# Patient Record
Sex: FEMALE | Race: WHITE | NOT HISPANIC OR LATINO | Employment: FULL TIME | URBAN - METROPOLITAN AREA
[De-identification: names, ages, dates, MRNs, and addresses within clinical notes are randomized per-mention and may not be internally consistent; named-entity substitution may affect disease eponyms.]

---

## 2023-02-20 PROBLEM — E66.3 OVERWEIGHT WITH BODY MASS INDEX (BMI) OF 27 TO 27.9 IN ADULT: Status: ACTIVE | Noted: 2023-02-20

## 2023-02-20 PROBLEM — R79.89 LOW VITAMIN D LEVEL: Status: ACTIVE | Noted: 2023-02-20

## 2023-02-20 PROBLEM — J02.9 SORE THROAT: Status: ACTIVE | Noted: 2023-02-20

## 2023-02-20 RX ORDER — PHENTERMINE HYDROCHLORIDE 37.5 MG/1
1 TABLET ORAL DAILY
COMMUNITY
End: 2023-03-17 | Stop reason: SDUPTHER

## 2023-02-20 RX ORDER — ROSUVASTATIN CALCIUM 5 MG/1
1 TABLET, COATED ORAL DAILY
COMMUNITY
End: 2023-07-31

## 2023-02-22 PROBLEM — E78.5 MILD HYPERLIPIDEMIA: Status: ACTIVE | Noted: 2023-02-22

## 2023-03-17 ENCOUNTER — OFFICE VISIT (OUTPATIENT)
Dept: PRIMARY CARE | Facility: CLINIC | Age: 48
End: 2023-03-17
Payer: COMMERCIAL

## 2023-03-17 VITALS
DIASTOLIC BLOOD PRESSURE: 80 MMHG | HEART RATE: 102 BPM | HEIGHT: 63 IN | TEMPERATURE: 97.6 F | WEIGHT: 144 LBS | SYSTOLIC BLOOD PRESSURE: 112 MMHG | OXYGEN SATURATION: 98 % | BODY MASS INDEX: 25.52 KG/M2

## 2023-03-17 DIAGNOSIS — E78.5 MILD HYPERLIPIDEMIA: ICD-10-CM

## 2023-03-17 DIAGNOSIS — E66.3 OVERWEIGHT WITH BODY MASS INDEX (BMI) OF 27 TO 27.9 IN ADULT: Primary | ICD-10-CM

## 2023-03-17 PROCEDURE — 4004F PT TOBACCO SCREEN RCVD TLK: CPT | Performed by: FAMILY MEDICINE

## 2023-03-17 PROCEDURE — 3008F BODY MASS INDEX DOCD: CPT | Performed by: FAMILY MEDICINE

## 2023-03-17 PROCEDURE — 99213 OFFICE O/P EST LOW 20 MIN: CPT | Performed by: FAMILY MEDICINE

## 2023-03-17 RX ORDER — PHENTERMINE HYDROCHLORIDE 37.5 MG/1
37.5 TABLET ORAL
Qty: 30 TABLET | Refills: 0 | Status: SHIPPED | OUTPATIENT
Start: 2023-03-17 | End: 2023-05-12

## 2023-03-17 RX ORDER — VALACYCLOVIR HYDROCHLORIDE 1 G/1
TABLET, FILM COATED ORAL
COMMUNITY
Start: 2023-02-20

## 2023-03-17 NOTE — PROGRESS NOTES
Subjective   Patient ID: Hannah Camacho is a 47 y.o. female who presents for Med Refill.    Assessment/Plan   Problem List Items Addressed This Visit          Endocrine/Metabolic    Overweight with body mass index (BMI) of 27 to 27.9 in adult - Primary       Other    Mild hyperlipidemia     Discussed about diet exercise  Lab work today  Patient is up to date with mammogram and Pap smear  Tetanus shot in 2014  Concerned about small skin rash on right lower extremity more of a seborrheic keratosis patient is reassured  Took COVID-19 vaccine  Controlled substance contract signed today  Urine drug screen  Follow-up in a month  Risk and benefit of Adipex explained    Follow-up every year for physical come back early with any new sign and symptoms. Patient understands and in agreement with plan   Source of history: Nurse, Medical personnel, Medical record, Patient.  History limitation: None.    HPI  47-year-old female here for follow-up on BMI 28  Patient tolerated medication well except dry mouth  Patient had around 7 to 8 pound weight loss in first month  Tolerating well otherwise needs refill  Urine drug screen today          Started smoking again   drug use  2-3 drinks of wine every day  Currently following with OB/GYN has a regular mammogram  BMI 27.9  Hyperlipidemia  On and off smoking  Vitamin D insufficiency  Family history of Hodgkin's lymphoma and mother    PT LOST AROUBD 2 LBS - WOULD LIKE TO CONT MED  Discussed about cutting down on smoking    Family history is positive for colon cancer. Colon cancer screening at the age of 47  Patient will be advised to go for colonoscopy       I have personally reviewed the [place] report for [facility] [employer] [employer]. I have considered the risks of abuse, dependence, addiction and diversion.   Last urine drug screening date/ordered today: 2/16/23   Controlled Substance Agreement:   I have printed this form and reviewed each line item with the patient and the patient has  "verbalized understanding.   Date of the last Controlled Substance Agreement: 1/19/23   [place]   What is the patient’s goal of therapy? To lose more than 5% of body weight.   Before starting treatment: I have assessed the patient’s continuing efforts to lose weight, I have assessed the patient’s dedication to the treatment program and the response to treatment and I have assessed the presence or absence of contraindications, adverse effects, and indicators of possible substance abuse that would necessitate cessation of treatment utilizing controlled substance.   Allergies   Allergen Reactions    Sulfa (Sulfonamide Antibiotics) Rash       Current Outpatient Medications   Medication Sig Dispense Refill    phentermine (Adipex-P) 37.5 mg tablet Take 1 tablet (37.5 mg) by mouth once daily.      rosuvastatin (Crestor) 5 mg tablet Take 1 tablet (5 mg) by mouth once daily.      sodium chloride (Ocean) 0.65 % nasal spray       valACYclovir (Valtrex) 1 gram tablet TAKE 1 TABLET BY MOUTH TWICE DAILY. AS NEEDED FOR OUTBREAK       No current facility-administered medications for this visit.       Objective   Visit Vitals  /80 (BP Location: Left arm, Patient Position: Sitting)   Pulse 102   Temp 36.4 °C (97.6 °F)   Ht 1.588 m (5' 2.5\")   Wt 65.3 kg (144 lb)   SpO2 98%   BMI 25.92 kg/m²   Smoking Status Every Day   BSA 1.7 m²     Physical Exam  Constitutional:       General: He is not in acute distress.     Appearance: Normal appearance.   HENT:      Head: Normocephalic and atraumatic.      Nose: Nose normal.   Eyes:      Extraocular Movements: Extraocular movements intact.      Conjunctiva/sclera: Conjunctivae normal.   Cardiovascular:      Rate and Rhythm: Normal rate and regular rhythm.   Pulmonary:      Effort: Pulmonary effort is normal.      Breath sounds: Normal breath sounds.   Skin:     General: Skin is warm.   Neurological:      Mental Status: He is alert and oriented to person, place, and time.   Psychiatric:  "        Mood and Affect: Mood normal.         Behavior: Behavior normal.     Immunization History   Administered Date(s) Administered    Influenza, seasonal, injectable 01/19/2023    Moderna SARS-CoV-2 Vaccination 02/10/2021, 03/10/2021, 06/01/2022       Review of Systems    Legacy Encounter on 02/16/2023   Component Date Value Ref Range Status    DRUG SCREEN COMMENT URINE 02/16/2023 SEE BELOW   Final    Creatine, Urine 02/16/2023 208.3  mg/dL Final    Amphetamine Screen, Urine 02/16/2023 PRESUMPTIVE POSITIVE (A)  NEGATIVE Final    Barbiturate Screen, Urine 02/16/2023 PRESUMPTIVE NEGATIVE  NEGATIVE Final    Cannabinoid Screen, Urine 02/16/2023 PRESUMPTIVE NEGATIVE  NEGATIVE Final    Cocaine Screen, Urine 02/16/2023 PRESUMPTIVE NEGATIVE  NEGATIVE Final    PCP Screen, Urine 02/16/2023 PRESUMPTIVE NEGATIVE  NEGATIVE Final    7-Aminoclonazepam 02/16/2023 <25  Cutoff <25 ng/mL Final    Alpha-Hydroxyalprazolam 02/16/2023 <25  Cutoff <25 ng/mL Final    Alpha-Hydroxymidazolam 02/16/2023 <25  Cutoff <25 ng/mL Final    Alprazolam 02/16/2023 <25  Cutoff <25 ng/mL Final    Chlordiazepoxide 02/16/2023 <25  Cutoff <25 ng/mL Final    Clonazepam 02/16/2023 <25  Cutoff <25 ng/mL Final    Diazepam 02/16/2023 <25  Cutoff <25 ng/mL Final    Lorazepam 02/16/2023 <25  Cutoff <25 ng/mL Final    Midazolam 02/16/2023 <25  Cutoff <25 ng/mL Final    Nordiazepam 02/16/2023 <25  Cutoff <25 ng/mL Final    Oxazepam 02/16/2023 <25  Cutoff <25 ng/mL Final    Temazepam 02/16/2023 <25  Cutoff <25 ng/mL Final    Zolpidem 02/16/2023 <25  Cutoff <25 ng/mL Final    Zolpidem Metabolite (ZCA) 02/16/2023 <25  Cutoff <25 ng/mL Final    6-Acetylmorphine 02/16/2023 <25  Cutoff <25 ng/mL Final    Codeine 02/16/2023 <50  Cutoff <50 ng/mL Final    Hydrocodone 02/16/2023 <25  Cutoff <25 ng/mL Final    Hydromorphone 02/16/2023 <25  Cutoff <25 ng/mL Final    Morphine Urine 02/16/2023 <50  Cutoff <50 ng/mL Final    Norhydrocodone 02/16/2023 <25  Cutoff <25 ng/mL Final     Noroxycodone 02/16/2023 <25  Cutoff <25 ng/mL Final    Oxycodone 02/16/2023 <25  Cutoff <25 ng/mL Final    Oxymorphone 02/16/2023 <25  Cutoff <25 ng/mL Final    Tramadol 02/16/2023 <50  Cutoff <50 ng/mL Final    O-Desmethyltramadol 02/16/2023 <50  Cutoff <50 ng/mL Final    Fentanyl 02/16/2023 <2.5  Cutoff<2.5 ng/mL Final    Norfentanyl 02/16/2023 <2.5  Cutoff<2.5 ng/mL Final    METHADONE CONFIRMATION,URINE 02/16/2023 <25  Cutoff <25 ng/mL Final    EDDP 02/16/2023 <25  Cutoff <25 ng/mL Final    Amphetamines,Urine 02/16/2023 <50  ng/mL Final    MDA, Urine 02/16/2023 <200  ng/mL Final    MDEA, Urine 02/16/2023 <200  ng/mL Final    MDMA, Urine 02/16/2023 <200  ng/mL Final    Methamphetamine Quant, Ur 02/16/2023 <200  ng/mL Final    Phentermine,Urine 02/16/2023 >5000  ng/mL Final   Legacy Encounter on 01/19/2023   Component Date Value Ref Range Status    Cholesterol 01/19/2023 264 (H)  0 - 199 mg/dL Final    HDL 01/19/2023 100.0  mg/dL Final    Cholesterol/HDL Ratio 01/19/2023 2.6   Final    LDL 01/19/2023 150 (H)  0 - 99 mg/dL Final    VLDL 01/19/2023 14  0 - 40 mg/dL Final    Triglycerides 01/19/2023 72  0 - 149 mg/dL Final    Vitamin D, 25-Hydroxy 01/19/2023 46  ng/mL Final    WBC 01/19/2023 6.5  4.4 - 11.3 x10E9/L Final    nRBC 01/19/2023 0.0  0.0 - 0.0 /100 WBC Final    RBC 01/19/2023 4.44  4.00 - 5.20 x10E12/L Final    Hemoglobin 01/19/2023 14.2  12.0 - 16.0 g/dL Final    Hematocrit 01/19/2023 43.9  36.0 - 46.0 % Final    MCV 01/19/2023 99  80 - 100 fL Final    MCHC 01/19/2023 32.3  32.0 - 36.0 g/dL Final    Platelets 01/19/2023 296  150 - 450 x10E9/L Final    RDW 01/19/2023 11.5  11.5 - 14.5 % Final    TSH 01/19/2023 2.37  0.44 - 3.98 mIU/L Final    Glucose 01/19/2023 91  74 - 99 mg/dL Final    Sodium 01/19/2023 140  136 - 145 mmol/L Final    Potassium 01/19/2023 4.0  3.5 - 5.3 mmol/L Final    Chloride 01/19/2023 101  98 - 107 mmol/L Final    Bicarbonate 01/19/2023 30  21 - 32 mmol/L Final    Anion Gap  2023 13  10 - 20 mmol/L Final    Urea Nitrogen 2023 12  6 - 23 mg/dL Final    Creatinine 2023 0.78  0.50 - 1.05 mg/dL Final    GFR Female 2023 >90  >90 mL/min/1.73m2 Final    Calcium 2023 10.5  8.6 - 10.6 mg/dL Final    Albumin 2023 4.9  3.4 - 5.0 g/dL Final    Alkaline Phosphatase 2023 44  33 - 110 U/L Final    Total Protein 2023 7.4  6.4 - 8.2 g/dL Final    AST 2023 22  9 - 39 U/L Final    Total Bilirubin 2023 1.0  0.0 - 1.2 mg/dL Final    ALT (SGPT) 2023 26  7 - 45 U/L Final       Radiology: Reviewed imaging in powerchart.  No results found.    Family History   Problem Relation Name Age of Onset    Lymphoma Mother      Other (CVA) Maternal Grandmother      Diabetes Paternal Grandmother      Colon cancer Other       Social History     Socioeconomic History    Marital status:      Spouse name: None    Number of children: None    Years of education: None    Highest education level: None   Occupational History    None   Tobacco Use    Smoking status: Every Day     Types: Cigarettes     Start date: 2022    Smokeless tobacco: Never   Vaping Use    Vaping status: None   Substance and Sexual Activity    Alcohol use: Yes     Alcohol/week: 5.0 standard drinks of alcohol     Types: 2 Glasses of wine, 1 Cans of beer, 1 Shots of liquor, 1 Standard drinks or equivalent per week    Drug use: Never    Sexual activity: None   Other Topics Concern    None   Social History Narrative    None     Social Determinants of Health     Financial Resource Strain: Not on file   Food Insecurity: Not on file   Transportation Needs: Not on file   Physical Activity: Not on file   Stress: Not on file   Social Connections: Not on file   Intimate Partner Violence: Not on file   Housing Stability: Not on file     Past Medical History:   Diagnosis Date    Personal history of other complications of pregnancy, childbirth and the puerperium     History of spontaneous      Personal history of other infectious and parasitic diseases     History of herpes simplex infection    Personal history of other mental and behavioral disorders     History of anxiety    Personal history of other mental and behavioral disorders     History of obsessive compulsive disorder     Past Surgical History:   Procedure Laterality Date    DILATION AND CURETTAGE OF UTERUS  04/27/2018    Dilation And Curettage       Charting was completed using voice recognition technology and may include unintended errors.

## 2023-03-17 NOTE — LETTER
March 17, 2023     Patient: Hannah Camacho   YOB: 1975   Date of Visit: 3/17/2023       To Whom It May Concern:    Hannah Camacho was seen in my clinic on 3/17/2023 at 9:30 am. Please excuse Hannah for her absence from work on this day to make the appointment.    If you have any questions or concerns, please don't hesitate to call.         Sincerely,         Genaro Padilla MD        CC: No Recipients

## 2023-05-12 ENCOUNTER — OFFICE VISIT (OUTPATIENT)
Dept: PRIMARY CARE | Facility: CLINIC | Age: 48
End: 2023-05-12
Payer: COMMERCIAL

## 2023-05-12 VITALS
BODY MASS INDEX: 25.69 KG/M2 | DIASTOLIC BLOOD PRESSURE: 82 MMHG | OXYGEN SATURATION: 99 % | HEIGHT: 63 IN | HEART RATE: 75 BPM | WEIGHT: 145 LBS | SYSTOLIC BLOOD PRESSURE: 131 MMHG

## 2023-05-12 DIAGNOSIS — E66.3 OVERWEIGHT WITH BODY MASS INDEX (BMI) OF 26 TO 26.9 IN ADULT: Primary | ICD-10-CM

## 2023-05-12 PROCEDURE — 3008F BODY MASS INDEX DOCD: CPT | Performed by: FAMILY MEDICINE

## 2023-05-12 PROCEDURE — 4004F PT TOBACCO SCREEN RCVD TLK: CPT | Performed by: FAMILY MEDICINE

## 2023-05-12 PROCEDURE — 99213 OFFICE O/P EST LOW 20 MIN: CPT | Performed by: FAMILY MEDICINE

## 2023-05-12 RX ORDER — CARBOXYMETHYLCELLULOSE/CITRIC 0.75 G
3 CAPSULE ORAL 2 TIMES DAILY
Qty: 60 CAPSULE | Refills: 1 | Status: SHIPPED | OUTPATIENT
Start: 2023-05-12 | End: 2023-07-11

## 2023-05-12 NOTE — LETTER
May 12, 2023     Patient: Hannah Camacho   YOB: 1975   Date of Visit: 5/12/2023       To Whom It May Concern:    Hannah Camacho was seen in my clinic on 5/12/2023 at 12:30 pm. Please excuse Hannah for her absence from work on this day to make the appointment.    If you have any questions or concerns, please don't hesitate to call.         Sincerely,         Genaro Padilla MD        CC: No Recipients

## 2023-05-12 NOTE — PROGRESS NOTES
"Subjective   Patient ID: Hannah Camacho is a 47 y.o. female who presents for Weight Loss.    Assessment/Plan   Problem List Items Addressed This Visit    None  Visit Diagnoses       Overweight with body mass index (BMI) of 26 to 26.9 in adult    -  Primary    Relevant Medications    carboxymethylcellulose-citric (Plenity, Welcome Kit,) 0.75 gram capsule        Discussed about diet exercise    Patient is up to date with mammogram and Pap smear  Tetanus shot in 2014        HPI        47-year-old female here     Here to discuss about weight loss options discussed in detail patient's BMI is 26  Will consider Plenity risk-benefit discussed also may consider Wellbutrin if patient is interested as patient started smoking back      Started smoking again  No drug use  2-3 drinks of wine every day  Currently following with OB/GYN has a regular mammogram  BMI 27.9  Hyperlipidemia  On and off smoking  Vitamin D insufficiency  Family history of Hodgkin's lymphoma and mother  Discussed about cutting down on smoking    Family history is positive for colon cancer. Colon cancer screening at the age of 47  Patient will be advised to go for colonoscopy       Allergies   Allergen Reactions    Sulfa (Sulfonamide Antibiotics) Rash       Current Outpatient Medications   Medication Sig Dispense Refill    rosuvastatin (Crestor) 5 mg tablet Take 1 tablet (5 mg) by mouth once daily.      carboxymethylcellulose-citric (Plenity, Welcome Kit,) 0.75 gram capsule Take 3 capsules by mouth 2 times a day. 60 capsule 1    sodium chloride (Ocean) 0.65 % nasal spray       valACYclovir (Valtrex) 1 gram tablet TAKE 1 TABLET BY MOUTH TWICE DAILY. AS NEEDED FOR OUTBREAK       No current facility-administered medications for this visit.       Objective   Visit Vitals  /82 (BP Location: Left arm, Patient Position: Sitting)   Pulse 75   Ht 1.588 m (5' 2.5\")   Wt 65.8 kg (145 lb)   SpO2 99%   BMI 26.10 kg/m²   Smoking Status Every Day   BSA 1.7 " m²     Physical Exam  Constitutional:       General: He is not in acute distress.     Appearance: Normal appearance.   HENT:      Head: Normocephalic and atraumatic.      Nose: Nose normal.   Eyes:      Extraocular Movements: Extraocular movements intact.      Conjunctiva/sclera: Conjunctivae normal.   Cardiovascular:      Rate and Rhythm: Normal rate and regular rhythm.   Pulmonary:      Effort: Pulmonary effort is normal.      Breath sounds: Normal breath sounds.   Skin:     General: Skin is warm.   Neurological:      Mental Status: He is alert and oriented to person, place, and time.   Psychiatric:         Mood and Affect: Mood normal.         Behavior: Behavior normal.     Immunization History   Administered Date(s) Administered    Influenza, seasonal, injectable 01/19/2023    Moderna SARS-CoV-2 Vaccination 02/10/2021, 03/10/2021, 06/01/2022       Review of Systems    Legacy Encounter on 02/16/2023   Component Date Value Ref Range Status    DRUG SCREEN COMMENT URINE 02/16/2023 SEE BELOW   Final    Creatine, Urine 02/16/2023 208.3  mg/dL Final    Amphetamine Screen, Urine 02/16/2023 PRESUMPTIVE POSITIVE (A)  NEGATIVE Final    Barbiturate Screen, Urine 02/16/2023 PRESUMPTIVE NEGATIVE  NEGATIVE Final    Cannabinoid Screen, Urine 02/16/2023 PRESUMPTIVE NEGATIVE  NEGATIVE Final    Cocaine Screen, Urine 02/16/2023 PRESUMPTIVE NEGATIVE  NEGATIVE Final    PCP Screen, Urine 02/16/2023 PRESUMPTIVE NEGATIVE  NEGATIVE Final    7-Aminoclonazepam 02/16/2023 <25  Cutoff <25 ng/mL Final    Alpha-Hydroxyalprazolam 02/16/2023 <25  Cutoff <25 ng/mL Final    Alpha-Hydroxymidazolam 02/16/2023 <25  Cutoff <25 ng/mL Final    Alprazolam 02/16/2023 <25  Cutoff <25 ng/mL Final    Chlordiazepoxide 02/16/2023 <25  Cutoff <25 ng/mL Final    Clonazepam 02/16/2023 <25  Cutoff <25 ng/mL Final    Diazepam 02/16/2023 <25  Cutoff <25 ng/mL Final    Lorazepam 02/16/2023 <25  Cutoff <25 ng/mL Final    Midazolam 02/16/2023 <25  Cutoff <25 ng/mL  Final    Nordiazepam 02/16/2023 <25  Cutoff <25 ng/mL Final    Oxazepam 02/16/2023 <25  Cutoff <25 ng/mL Final    Temazepam 02/16/2023 <25  Cutoff <25 ng/mL Final    Zolpidem 02/16/2023 <25  Cutoff <25 ng/mL Final    Zolpidem Metabolite (ZCA) 02/16/2023 <25  Cutoff <25 ng/mL Final    6-Acetylmorphine 02/16/2023 <25  Cutoff <25 ng/mL Final    Codeine 02/16/2023 <50  Cutoff <50 ng/mL Final    Hydrocodone 02/16/2023 <25  Cutoff <25 ng/mL Final    Hydromorphone 02/16/2023 <25  Cutoff <25 ng/mL Final    Morphine Urine 02/16/2023 <50  Cutoff <50 ng/mL Final    Norhydrocodone 02/16/2023 <25  Cutoff <25 ng/mL Final    Noroxycodone 02/16/2023 <25  Cutoff <25 ng/mL Final    Oxycodone 02/16/2023 <25  Cutoff <25 ng/mL Final    Oxymorphone 02/16/2023 <25  Cutoff <25 ng/mL Final    Tramadol 02/16/2023 <50  Cutoff <50 ng/mL Final    O-Desmethyltramadol 02/16/2023 <50  Cutoff <50 ng/mL Final    Fentanyl 02/16/2023 <2.5  Cutoff<2.5 ng/mL Final    Norfentanyl 02/16/2023 <2.5  Cutoff<2.5 ng/mL Final    METHADONE CONFIRMATION,URINE 02/16/2023 <25  Cutoff <25 ng/mL Final    EDDP 02/16/2023 <25  Cutoff <25 ng/mL Final    Amphetamines,Urine 02/16/2023 <50  ng/mL Final    MDA, Urine 02/16/2023 <200  ng/mL Final    MDEA, Urine 02/16/2023 <200  ng/mL Final    MDMA, Urine 02/16/2023 <200  ng/mL Final    Methamphetamine Quant, Ur 02/16/2023 <200  ng/mL Final    Phentermine,Urine 02/16/2023 >5000  ng/mL Final   Legacy Encounter on 01/19/2023   Component Date Value Ref Range Status    Cholesterol 01/19/2023 264 (H)  0 - 199 mg/dL Final    HDL 01/19/2023 100.0  mg/dL Final    Cholesterol/HDL Ratio 01/19/2023 2.6   Final    LDL 01/19/2023 150 (H)  0 - 99 mg/dL Final    VLDL 01/19/2023 14  0 - 40 mg/dL Final    Triglycerides 01/19/2023 72  0 - 149 mg/dL Final    Vitamin D, 25-Hydroxy 01/19/2023 46  ng/mL Final    WBC 01/19/2023 6.5  4.4 - 11.3 x10E9/L Final    nRBC 01/19/2023 0.0  0.0 - 0.0 /100 WBC Final    RBC 01/19/2023 4.44  4.00 - 5.20  x10E12/L Final    Hemoglobin 01/19/2023 14.2  12.0 - 16.0 g/dL Final    Hematocrit 01/19/2023 43.9  36.0 - 46.0 % Final    MCV 01/19/2023 99  80 - 100 fL Final    MCHC 01/19/2023 32.3  32.0 - 36.0 g/dL Final    Platelets 01/19/2023 296  150 - 450 x10E9/L Final    RDW 01/19/2023 11.5  11.5 - 14.5 % Final    TSH 01/19/2023 2.37  0.44 - 3.98 mIU/L Final    Glucose 01/19/2023 91  74 - 99 mg/dL Final    Sodium 01/19/2023 140  136 - 145 mmol/L Final    Potassium 01/19/2023 4.0  3.5 - 5.3 mmol/L Final    Chloride 01/19/2023 101  98 - 107 mmol/L Final    Bicarbonate 01/19/2023 30  21 - 32 mmol/L Final    Anion Gap 01/19/2023 13  10 - 20 mmol/L Final    Urea Nitrogen 01/19/2023 12  6 - 23 mg/dL Final    Creatinine 01/19/2023 0.78  0.50 - 1.05 mg/dL Final    GFR Female 01/19/2023 >90  >90 mL/min/1.73m2 Final    Calcium 01/19/2023 10.5  8.6 - 10.6 mg/dL Final    Albumin 01/19/2023 4.9  3.4 - 5.0 g/dL Final    Alkaline Phosphatase 01/19/2023 44  33 - 110 U/L Final    Total Protein 01/19/2023 7.4  6.4 - 8.2 g/dL Final    AST 01/19/2023 22  9 - 39 U/L Final    Total Bilirubin 01/19/2023 1.0  0.0 - 1.2 mg/dL Final    ALT (SGPT) 01/19/2023 26  7 - 45 U/L Final       Radiology: Reviewed imaging in powerchart.  No results found.    Family History   Problem Relation Name Age of Onset    Lymphoma Mother      Other (CVA) Maternal Grandmother      Diabetes Paternal Grandmother      Colon cancer Other       Social History     Socioeconomic History    Marital status:      Spouse name: None    Number of children: None    Years of education: None    Highest education level: None   Occupational History    None   Tobacco Use    Smoking status: Every Day     Types: Cigarettes     Start date: 8/1/2022    Smokeless tobacco: Never   Vaping Use    Vaping status: None   Substance and Sexual Activity    Alcohol use: Yes     Alcohol/week: 5.0 standard drinks of alcohol     Types: 2 Glasses of wine, 1 Cans of beer, 1 Shots of liquor, 1 Standard  drinks or equivalent per week    Drug use: Never    Sexual activity: None   Other Topics Concern    None   Social History Narrative    None     Social Determinants of Health     Financial Resource Strain: Not on file   Food Insecurity: Not on file   Transportation Needs: Not on file   Physical Activity: Not on file   Stress: Not on file   Social Connections: Not on file   Intimate Partner Violence: Not on file   Housing Stability: Not on file     Past Medical History:   Diagnosis Date    Personal history of other complications of pregnancy, childbirth and the puerperium     History of spontaneous     Personal history of other infectious and parasitic diseases     History of herpes simplex infection    Personal history of other mental and behavioral disorders     History of anxiety    Personal history of other mental and behavioral disorders     History of obsessive compulsive disorder     Past Surgical History:   Procedure Laterality Date    DILATION AND CURETTAGE OF UTERUS  2018    Dilation And Curettage       Charting was completed using voice recognition technology and may include unintended errors.

## 2023-07-24 ENCOUNTER — APPOINTMENT (OUTPATIENT)
Dept: PRIMARY CARE | Facility: CLINIC | Age: 48
End: 2023-07-24
Payer: COMMERCIAL

## 2023-07-28 DIAGNOSIS — E78.5 HYPERLIPIDEMIA, UNSPECIFIED: ICD-10-CM

## 2023-07-31 RX ORDER — ROSUVASTATIN CALCIUM 5 MG/1
5 TABLET, COATED ORAL DAILY
Qty: 90 TABLET | Refills: 1 | Status: SHIPPED | OUTPATIENT
Start: 2023-07-31 | End: 2024-02-06

## 2023-08-22 ENCOUNTER — TELEPHONE (OUTPATIENT)
Dept: PRIMARY CARE | Facility: CLINIC | Age: 48
End: 2023-08-22

## 2023-08-22 NOTE — TELEPHONE ENCOUNTER
NEEDED TO RESCHEDULE DUE WORK. SHE WILL CONTACT YOU BY MY CHART TO LET YOU KNOW WHAT VACCINES SHE NEEDS

## 2023-08-23 ENCOUNTER — APPOINTMENT (OUTPATIENT)
Dept: PRIMARY CARE | Facility: CLINIC | Age: 48
End: 2023-08-23
Payer: COMMERCIAL

## 2023-09-06 ENCOUNTER — TELEPHONE (OUTPATIENT)
Dept: PRIMARY CARE | Facility: CLINIC | Age: 48
End: 2023-09-06

## 2023-09-06 ENCOUNTER — CLINICAL SUPPORT (OUTPATIENT)
Dept: PRIMARY CARE | Facility: CLINIC | Age: 48
End: 2023-09-06
Payer: COMMERCIAL

## 2023-09-06 DIAGNOSIS — Z23 NEED FOR TDAP VACCINATION: ICD-10-CM

## 2023-09-06 PROCEDURE — 90471 IMMUNIZATION ADMIN: CPT | Performed by: FAMILY MEDICINE

## 2023-09-06 PROCEDURE — 90715 TDAP VACCINE 7 YRS/> IM: CPT | Performed by: FAMILY MEDICINE

## 2024-01-29 DIAGNOSIS — E78.5 HYPERLIPIDEMIA, UNSPECIFIED: ICD-10-CM

## 2024-02-06 RX ORDER — ROSUVASTATIN CALCIUM 5 MG/1
5 TABLET, COATED ORAL DAILY
Qty: 90 TABLET | Refills: 0 | Status: SHIPPED | OUTPATIENT
Start: 2024-02-06 | End: 2024-05-07

## 2024-02-22 ENCOUNTER — OFFICE VISIT (OUTPATIENT)
Dept: NEUROLOGY | Facility: CLINIC | Age: 49
End: 2024-02-22
Payer: COMMERCIAL

## 2024-02-22 VITALS
HEIGHT: 63 IN | DIASTOLIC BLOOD PRESSURE: 72 MMHG | BODY MASS INDEX: 25.87 KG/M2 | SYSTOLIC BLOOD PRESSURE: 114 MMHG | HEART RATE: 66 BPM | WEIGHT: 146 LBS

## 2024-02-22 DIAGNOSIS — R79.89 LOW VITAMIN D LEVEL: ICD-10-CM

## 2024-02-22 DIAGNOSIS — G93.40 ENCEPHALOPATHY: Primary | ICD-10-CM

## 2024-02-22 DIAGNOSIS — F90.2 ATTENTION DEFICIT HYPERACTIVITY DISORDER (ADHD), COMBINED TYPE: ICD-10-CM

## 2024-02-22 LAB
AMPHETAMINES UR QL SCN: NORMAL
BARBITURATES UR QL SCN: NORMAL
BENZODIAZ UR QL SCN: NORMAL
BZE UR QL SCN: NORMAL
CANNABINOIDS UR QL SCN: NORMAL
FENTANYL+NORFENTANYL UR QL SCN: NORMAL
OPIATES UR QL SCN: NORMAL
OXYCODONE+OXYMORPHONE UR QL SCN: NORMAL
PCP UR QL SCN: NORMAL

## 2024-02-22 PROCEDURE — 3008F BODY MASS INDEX DOCD: CPT | Performed by: PSYCHIATRY & NEUROLOGY

## 2024-02-22 PROCEDURE — 80307 DRUG TEST PRSMV CHEM ANLYZR: CPT

## 2024-02-22 PROCEDURE — 99205 OFFICE O/P NEW HI 60 MIN: CPT | Performed by: PSYCHIATRY & NEUROLOGY

## 2024-02-22 RX ORDER — DEXMETHYLPHENIDATE HYDROCHLORIDE 10 MG/1
10 TABLET ORAL DAILY
Qty: 30 TABLET | Refills: 0 | Status: SHIPPED | OUTPATIENT
Start: 2024-02-22 | End: 2024-05-06 | Stop reason: SDUPTHER

## 2024-02-22 RX ORDER — DEXMETHYLPHENIDATE HYDROCHLORIDE 20 MG/1
20 CAPSULE, EXTENDED RELEASE ORAL DAILY
Qty: 30 CAPSULE | Refills: 0 | Status: SHIPPED | OUTPATIENT
Start: 2024-02-22 | End: 2024-05-06 | Stop reason: SDUPTHER

## 2024-02-22 RX ORDER — DEXMETHYLPHENIDATE HYDROCHLORIDE 20 MG/1
20 CAPSULE, EXTENDED RELEASE ORAL DAILY
Qty: 30 CAPSULE | Refills: 0 | Status: SHIPPED | OUTPATIENT
Start: 2024-03-23 | End: 2024-05-06 | Stop reason: SDUPTHER

## 2024-02-22 RX ORDER — DEXMETHYLPHENIDATE HYDROCHLORIDE 20 MG/1
20 CAPSULE, EXTENDED RELEASE ORAL DAILY
Qty: 30 CAPSULE | Refills: 0 | Status: SHIPPED | OUTPATIENT
Start: 2024-04-22 | End: 2024-05-06 | Stop reason: SDUPTHER

## 2024-02-22 RX ORDER — DEXMETHYLPHENIDATE HYDROCHLORIDE 10 MG/1
10 TABLET ORAL DAILY
Qty: 30 TABLET | Refills: 0 | Status: SHIPPED | OUTPATIENT
Start: 2024-04-22 | End: 2024-05-06 | Stop reason: SDUPTHER

## 2024-02-22 RX ORDER — DEXMETHYLPHENIDATE HYDROCHLORIDE 10 MG/1
10 TABLET ORAL DAILY
Qty: 30 TABLET | Refills: 0 | Status: SHIPPED | OUTPATIENT
Start: 2024-03-23 | End: 2024-05-06 | Stop reason: SDUPTHER

## 2024-02-22 ASSESSMENT — PATIENT HEALTH QUESTIONNAIRE - PHQ9
2. FEELING DOWN, DEPRESSED OR HOPELESS: NOT AT ALL
1. LITTLE INTEREST OR PLEASURE IN DOING THINGS: NOT AT ALL
SUM OF ALL RESPONSES TO PHQ9 QUESTIONS 1 & 2: 0

## 2024-02-22 ASSESSMENT — ENCOUNTER SYMPTOMS: DECREASED CONCENTRATION: 1

## 2024-02-22 NOTE — PROGRESS NOTES
Hannah Cortes Doug  48 y.o.       SUBJECTIVE    HPI   Hannah is a 48-year-old young lady who was seen today for evaluation of a possible attention deficit disorder difficulty at work and at home.  She had the symptoms since early childhood but never diagnosed or treated with any medication.  On the diagnostic checklist is suggestive of combined type of ADHD.  Since the condition is affecting her socially and functionally I would like to try her on Focalin XR 20 in the morning and take 10 in the afternoon as needed and depending on how she does might make future recommendation    As you recall, Hannah is a 48-year-old lady has been having difficulty since early childhood according to her she is having hard time with her attention span concentration and focusing.  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  On the diagnostic checklist she had 6 out of 9 for both inattentive and hyperactive type of ADHD    Her birth development past medical history is unremarkable except she had short upper and lower extremities.    She lives with her boyfriend and has a 24-year-old and a 10-year-old son out of which 10-year-old son has ADHD and currently on Focalin.  She has 2 sisters and a brother and 1 sister has ADHD and bipolar and other brother has ADHD but she is not sure what medicine they are on    No history of smoking alcohol or any substance abuse.      Due to technical limitations of voice recognition and human error, this note may not accurately reflect the care of the patient.    Review of Systems   Psychiatric/Behavioral:  Positive for decreased concentration.         Patient Active Problem List   Diagnosis    Low vitamin D level    Sore throat    Mild hyperlipidemia     Past Medical History:   Diagnosis Date    Personal history of other complications of pregnancy, childbirth and the puerperium     History of spontaneous     Personal history of other  "infectious and parasitic diseases     History of herpes simplex infection    Personal history of other mental and behavioral disorders     History of anxiety    Personal history of other mental and behavioral disorders     History of obsessive compulsive disorder     Past Surgical History:   Procedure Laterality Date    DILATION AND CURETTAGE OF UTERUS  04/27/2018    Dilation And Curettage       reports that she has been smoking cigarettes. She started smoking about 18 months ago. She has been smoking an average of .25 packs per day. She has never used smokeless tobacco. She reports current alcohol use of about 5.0 standard drinks of alcohol per week. She reports that she does not use drugs.    /72 (BP Location: Right arm, Patient Position: Sitting)   Pulse 66   Ht 1.588 m (5' 2.5\")   Wt 66.2 kg (146 lb)   BMI 26.28 kg/m²     OBJECTIVE  Physical Exam/Neurological Exam   Constitutional: General appearance: no acute distress   Auscultation of Heart: Regular rate and rhythm, no murmurs, normal S1 and S2.   Carotid Arteries: Intact without any bruits.   Neck is supple.   No lymph adenopathy.   Peripheral Vascular Exam: Pulses +2 and equal in all extremities. No swelling, varicosities, edema or tenderness to palpations.  Chart upper and lower extremities.  Abdomen is soft, nondistended. No organomegaly.  Mental status: The patient was in no distress, alert, interactive and cooperative. Affect is appropriate.   Orientation: oriented to person, oriented to place and oriented to time.   Memory: recent memory intact and remote memory intact.   Attention: normal attention span and normal concentrating ability.   Language: normal comprehension and no difficulty naming common objects.   Fund of knowledge: Patient displays adequate knowledge of current events, adequate fund of knowledge regarding past history and adequate fund of knowledge regarding vocabulary.   Eyes: The ophthalmoscopic examination was normal. The " fundi are visualized with normal disc margins and without.  Cranial nerve II: Visual fields full to confrontation.   Cranial nerves III, IV, and VI: Pupils round, equally reactive to light; no ptosis. EOMs intact. No nystagmus.   Cranial Nerve V: Facial sensation intact bilaterally.   Cranial nerve VII: Normal and symmetric facial strength.   Cranial nerve VIII: Hearing is intact bilaterally to finger rub / whisper.   Cranial nerves IX and X: Palate elevates symmetrically.   Cranial nerve XI: Shoulder shrug and neck rotation strength are intact.   Cranial nerve XII: Tongue midline with normal strength.   Motor: Motor exam was normal. Muscle bulk was normal in both upper and lower extremities. Muscle tone was normal in both upper and lower extremities. Muscle strength was 5/5 throughout. no abnormal or adventitious movements were present.   Deep Tendon Reflexes: left biceps 2+ , right biceps 2+, left triceps 2+, right triceps 2+, left brachioradialis 2+, right brachioradialis 2+, left patella 2+, right patella 2+, left ankle jerk 2+, right ankle jerk 2+   Plantar Reflex: Toes downgoing to plantar stimulation on the left. Toes downgoing to plantar stimulation on the right.   Sensory Exam: Normal to light touch.   Coordination: There is no limb dystaxia and rapid alternating movements are intact.  Gait: Gait is normal without spasticity, ataxia or bradykinesia. Stance is stable with a negative Romberg.      ASSESSMENT/PLAN  Diagnoses and all orders for this visit:  Encephalopathy  Attention deficit hyperactivity disorder (ADHD), combined type  -     dexmethylphenidate XR (Focalin XR) 20 mg 24 hr capsule; Take 1 capsule (20 mg) by mouth once daily. Do not crush, chew, or split.  -     dexmethylphenidate XR (Focalin XR) 20 mg 24 hr capsule; Take 1 capsule (20 mg) by mouth once daily. Do not crush, chew, or split. Do not start before March 23, 2024.  -     dexmethylphenidate XR (Focalin XR) 20 mg 24 hr capsule; Take 1  capsule (20 mg) by mouth once daily. Do not crush, chew, or split. Do not start before April 22, 2024.  -     dexmethylphenidate (Focalin) 10 mg tablet; Take 1 tablet (10 mg) by mouth once daily.  -     dexmethylphenidate (Focalin) 10 mg tablet; Take 1 tablet (10 mg) by mouth once daily. Do not start before March 23, 2024.  -     dexmethylphenidate (Focalin) 10 mg tablet; Take 1 tablet (10 mg) by mouth once daily. Do not start before April 22, 2024.  -     Drug Screen, Urine With Reflex to Confirmation; Future  Low vitamin D level        Altaf Grimaldo MD  2/22/2024  12:28 PM

## 2024-03-21 PROBLEM — R93.89 ABNORMAL FINDING OF DIAGNOSTIC IMAGING: Status: ACTIVE | Noted: 2024-03-21

## 2024-03-21 PROBLEM — N83.291 COMPLEX CYST OF RIGHT OVARY: Status: ACTIVE | Noted: 2023-11-02

## 2024-03-21 PROBLEM — R07.9 CHEST PAIN: Status: ACTIVE | Noted: 2018-08-09

## 2024-03-21 PROBLEM — N83.8 PARATUBAL CYST: Status: ACTIVE | Noted: 2023-11-02

## 2024-03-21 PROBLEM — M79.629 PAIN IN AXILLA: Status: ACTIVE | Noted: 2024-03-21

## 2024-03-21 PROBLEM — D25.1 INTRAMURAL UTERINE FIBROID: Status: ACTIVE | Noted: 2023-11-02

## 2024-03-21 PROBLEM — Z86.19 HISTORY OF VIRAL INFECTION: Status: ACTIVE | Noted: 2024-03-21

## 2024-03-21 PROBLEM — M54.41 CHRONIC RIGHT-SIDED LOW BACK PAIN WITH RIGHT-SIDED SCIATICA: Status: ACTIVE | Noted: 2018-11-19

## 2024-03-21 PROBLEM — N64.89 PSEUDOANGIOMATOUS STROMAL HYPERPLASIA OF BREAST: Status: ACTIVE | Noted: 2020-02-01

## 2024-03-21 PROBLEM — J30.9 ALLERGIC RHINITIS: Status: ACTIVE | Noted: 2018-11-19

## 2024-03-21 PROBLEM — R10.11 RIGHT UPPER QUADRANT ABDOMINAL PAIN: Status: ACTIVE | Noted: 2018-08-09

## 2024-03-21 PROBLEM — N83.292 COMPLEX CYST OF LEFT OVARY: Status: ACTIVE | Noted: 2023-11-02

## 2024-03-21 PROBLEM — N60.11 FIBROCYSTIC BREAST CHANGES, BILATERAL: Status: ACTIVE | Noted: 2020-02-01

## 2024-03-21 PROBLEM — G89.29 CHRONIC RIGHT-SIDED LOW BACK PAIN WITH RIGHT-SIDED SCIATICA: Status: ACTIVE | Noted: 2018-11-19

## 2024-03-21 PROBLEM — R21 RASH: Status: ACTIVE | Noted: 2024-03-21

## 2024-03-21 PROBLEM — N60.12 FIBROCYSTIC BREAST CHANGES, BILATERAL: Status: ACTIVE | Noted: 2020-02-01

## 2024-03-21 PROBLEM — N80.03 ADENOMYOSIS OF THE UTERUS: Status: ACTIVE | Noted: 2023-11-02

## 2024-03-21 PROBLEM — Z86.59 HISTORY OF MENTAL DISORDER: Status: ACTIVE | Noted: 2024-03-21

## 2024-03-21 RX ORDER — VALACYCLOVIR HYDROCHLORIDE 1 G/1
1000 TABLET, FILM COATED ORAL 2 TIMES DAILY
COMMUNITY
Start: 2023-08-09

## 2024-03-21 RX ORDER — METHYLPREDNISOLONE 4 MG/1
TABLET ORAL
COMMUNITY
Start: 2024-03-11

## 2024-03-21 RX ORDER — VALACYCLOVIR HYDROCHLORIDE 500 MG/1
500 TABLET, FILM COATED ORAL DAILY
COMMUNITY

## 2024-03-21 RX ORDER — DOXYCYCLINE 100 MG/1
CAPSULE ORAL
COMMUNITY
Start: 2023-05-18

## 2024-03-21 RX ORDER — METRONIDAZOLE 500 MG/1
TABLET ORAL
COMMUNITY
Start: 2023-08-12

## 2024-03-21 RX ORDER — LEVONORGESTREL 52 MG/1
1 INTRAUTERINE DEVICE INTRAUTERINE
COMMUNITY

## 2024-03-21 RX ORDER — AMOXICILLIN 875 MG/1
875 TABLET, FILM COATED ORAL EVERY 12 HOURS
COMMUNITY
Start: 2024-03-11 | End: 2024-03-25

## 2024-03-21 RX ORDER — FLUCONAZOLE 150 MG/1
150 TABLET ORAL ONCE
COMMUNITY
Start: 2023-08-12

## 2024-04-23 ENCOUNTER — APPOINTMENT (OUTPATIENT)
Dept: NEUROLOGY | Facility: CLINIC | Age: 49
End: 2024-04-23
Payer: COMMERCIAL

## 2024-05-03 DIAGNOSIS — E78.5 HYPERLIPIDEMIA, UNSPECIFIED: ICD-10-CM

## 2024-05-06 ENCOUNTER — OFFICE VISIT (OUTPATIENT)
Dept: NEUROLOGY | Facility: CLINIC | Age: 49
End: 2024-05-06
Payer: COMMERCIAL

## 2024-05-06 VITALS
DIASTOLIC BLOOD PRESSURE: 68 MMHG | HEART RATE: 93 BPM | BODY MASS INDEX: 26.61 KG/M2 | WEIGHT: 150.2 LBS | HEIGHT: 63 IN | SYSTOLIC BLOOD PRESSURE: 100 MMHG

## 2024-05-06 DIAGNOSIS — G93.40 ENCEPHALOPATHY: Primary | ICD-10-CM

## 2024-05-06 DIAGNOSIS — F90.2 ATTENTION DEFICIT HYPERACTIVITY DISORDER (ADHD), COMBINED TYPE: ICD-10-CM

## 2024-05-06 PROCEDURE — 99214 OFFICE O/P EST MOD 30 MIN: CPT | Performed by: PSYCHIATRY & NEUROLOGY

## 2024-05-06 PROCEDURE — 3008F BODY MASS INDEX DOCD: CPT | Performed by: PSYCHIATRY & NEUROLOGY

## 2024-05-06 RX ORDER — DEXMETHYLPHENIDATE HYDROCHLORIDE 20 MG/1
20 CAPSULE, EXTENDED RELEASE ORAL DAILY
Qty: 30 CAPSULE | Refills: 0 | Status: SHIPPED | OUTPATIENT
Start: 2024-06-05 | End: 2024-07-05

## 2024-05-06 RX ORDER — DEXMETHYLPHENIDATE HYDROCHLORIDE 20 MG/1
20 CAPSULE, EXTENDED RELEASE ORAL DAILY
Qty: 30 CAPSULE | Refills: 0 | Status: SHIPPED | OUTPATIENT
Start: 2024-05-06 | End: 2024-05-07 | Stop reason: SDUPTHER

## 2024-05-06 RX ORDER — DEXMETHYLPHENIDATE HYDROCHLORIDE 10 MG/1
10 TABLET ORAL DAILY
Qty: 30 TABLET | Refills: 0 | Status: SHIPPED | OUTPATIENT
Start: 2024-07-05 | End: 2024-08-04

## 2024-05-06 RX ORDER — DEXMETHYLPHENIDATE HYDROCHLORIDE 10 MG/1
10 TABLET ORAL DAILY
Qty: 30 TABLET | Refills: 0 | Status: SHIPPED | OUTPATIENT
Start: 2024-05-06 | End: 2024-05-07 | Stop reason: SDUPTHER

## 2024-05-06 RX ORDER — DEXMETHYLPHENIDATE HYDROCHLORIDE 10 MG/1
10 TABLET ORAL DAILY
Qty: 30 TABLET | Refills: 0 | Status: SHIPPED | OUTPATIENT
Start: 2024-06-05 | End: 2024-07-05

## 2024-05-06 RX ORDER — DEXMETHYLPHENIDATE HYDROCHLORIDE 20 MG/1
20 CAPSULE, EXTENDED RELEASE ORAL DAILY
Qty: 30 CAPSULE | Refills: 0 | Status: SHIPPED | OUTPATIENT
Start: 2024-07-05 | End: 2024-08-04

## 2024-05-06 ASSESSMENT — ENCOUNTER SYMPTOMS
SINUS PRESSURE: 0
NECK STIFFNESS: 0
DYSPHORIC MOOD: 1
FEVER: 0
WEAKNESS: 0
SEIZURES: 0
JOINT SWELLING: 0
NUMBNESS: 0
AGITATION: 0
SHORTNESS OF BREATH: 0
HEADACHES: 0
NERVOUS/ANXIOUS: 1
WHEEZING: 0
SPEECH DIFFICULTY: 0
VOMITING: 0
BACK PAIN: 0
FATIGUE: 0
DIFFICULTY URINATING: 0
PALPITATIONS: 0
SLEEP DISTURBANCE: 0
ADENOPATHY: 0
FREQUENCY: 0
UNEXPECTED WEIGHT CHANGE: 0
TROUBLE SWALLOWING: 0
NECK PAIN: 0
ABDOMINAL PAIN: 0
COUGH: 0
TREMORS: 0
CONFUSION: 0
DECREASED CONCENTRATION: 1
ARTHRALGIAS: 0
PHOTOPHOBIA: 0
NAUSEA: 0
DIZZINESS: 0
FACIAL ASYMMETRY: 0
HALLUCINATIONS: 0
EYE PAIN: 0
HYPERACTIVE: 0
LIGHT-HEADEDNESS: 0
BRUISES/BLEEDS EASILY: 0

## 2024-05-06 ASSESSMENT — PATIENT HEALTH QUESTIONNAIRE - PHQ9
SUM OF ALL RESPONSES TO PHQ9 QUESTIONS 1 & 2: 0
1. LITTLE INTEREST OR PLEASURE IN DOING THINGS: NOT AT ALL
2. FEELING DOWN, DEPRESSED OR HOPELESS: NOT AT ALL

## 2024-05-06 NOTE — PROGRESS NOTES
Hannah Cortes ber  48 y.o.       SUBJECTIVE    Encephalopathy  Presenting symptoms: no confusion    Associated symptoms: no abdominal pain, no agitation, no fever, no hallucinations, no headaches, no light-headedness, no nausea, no palpitations, no rash, no seizures, no vomiting and no weakness       Hannah is a 48-year-old young lady who was seen today for follow-up of her encephalopathy due to attention deficit disorder difficulty at work and home.  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  No side effects from the medication.  Today her physical and neurological is unchanged.  I would like to continue her medicine the way she is taking and have discussed the controlled substance policy, abuse of potential risk benefit and the precautions to be taken and depending on how she does I might make future recommendation when she comes back to see me in 3 months.    I did review the medication list.      Due to technical limitations of voice recognition and human error, this note may not accurately reflect the care of the patient.    Review of Systems   Constitutional:  Negative for fatigue, fever and unexpected weight change.   HENT:  Negative for dental problem, ear pain, hearing loss, sinus pressure, tinnitus and trouble swallowing.    Eyes:  Negative for photophobia, pain and visual disturbance.   Respiratory:  Negative for cough, shortness of breath and wheezing.    Cardiovascular:  Negative for chest pain, palpitations and leg swelling.   Gastrointestinal:  Negative for abdominal pain, nausea and vomiting.   Genitourinary:  Negative for difficulty urinating, enuresis and frequency.   Musculoskeletal:  Negative for arthralgias, back pain, joint swelling, neck pain and neck stiffness.   Skin:  Negative for pallor and rash.    Allergic/Immunologic: Negative for food allergies.   Neurological:  Negative for dizziness, tremors, seizures, syncope, facial asymmetry, speech difficulty, weakness, light-headedness, numbness and headaches.   Hematological:  Negative for adenopathy. Does not bruise/bleed easily.   Psychiatric/Behavioral:  Positive for decreased concentration and dysphoric mood. Negative for agitation, behavioral problems, confusion, hallucinations and sleep disturbance. The patient is nervous/anxious. The patient is not hyperactive.         Patient Active Problem List   Diagnosis    Low vitamin D level    Sore throat    Hyperlipidemia    Abnormal finding of diagnostic imaging    Adenomyosis of the uterus    IUD (intrauterine device) in place    Allergic rhinitis    Chest pain    Chronic right-sided low back pain with right-sided sciatica    Complex cyst of left ovary    Complex cyst of right ovary    Fibrocystic breast changes, bilateral    Genital herpes    H/O gestational diabetes mellitus, not currently pregnant    History of mental disorder    History of viral infection    Intramural uterine fibroid    Pain in axilla    Paratubal cyst    Pseudoangiomatous stromal hyperplasia of breast    Rash    Right upper quadrant abdominal pain     Past Medical History:   Diagnosis Date    Personal history of other complications of pregnancy, childbirth and the puerperium     History of spontaneous     Personal history of other infectious and parasitic diseases     History of herpes simplex infection    Personal history of other mental and behavioral disorders     History of anxiety    Personal history of other mental and behavioral disorders     History of obsessive compulsive disorder     Past Surgical History:   Procedure Laterality Date    DILATION AND CURETTAGE OF UTERUS  2018    Dilation And Curettage       reports that she has been smoking cigarettes. She started smoking about 21 months ago. She has a 0.4 pack-year  "smoking history. She has never used smokeless tobacco. She reports current alcohol use of about 5.0 standard drinks of alcohol per week. She reports that she does not use drugs.    /68 (BP Location: Left arm, Patient Position: Sitting, BP Cuff Size: Large adult)   Pulse 93   Ht 1.588 m (5' 2.5\")   Wt 68.1 kg (150 lb 3.2 oz)   BMI 27.03 kg/m²     OBJECTIVE  Physical Exam/Neurological Exam   Physical Exam/Neurological Exam   Constitutional: General appearance: no acute distress   Auscultation of Heart: Regular rate and rhythm, no murmurs, normal S1 and S2.   Carotid Arteries: Intact without any bruits.   Neck is supple.   No lymph adenopathy.   Peripheral Vascular Exam: Pulses +2 and equal in all extremities. No swelling, varicosities, edema or tenderness to palpations.  Chart upper and lower extremities.  Abdomen is soft, nondistended. No organomegaly.  Mental status: The patient was in no distress, alert, interactive and cooperative. Affect is appropriate.   Orientation: oriented to person, oriented to place and oriented to time.   Memory: recent memory intact and remote memory intact.   Attention: normal attention span and normal concentrating ability.   Language: normal comprehension and no difficulty naming common objects.   Fund of knowledge: Patient displays adequate knowledge of current events, adequate fund of knowledge regarding past history and adequate fund of knowledge regarding vocabulary.   Eyes: The ophthalmoscopic examination was normal. The fundi are visualized with normal disc margins and without.  Cranial nerve II: Visual fields full to confrontation.   Cranial nerves III, IV, and VI: Pupils round, equally reactive to light; no ptosis. EOMs intact. No nystagmus.   Cranial Nerve V: Facial sensation intact bilaterally.   Cranial nerve VII: Normal and symmetric facial strength.   Cranial nerve VIII: Hearing is intact bilaterally to finger rub / whisper.   Cranial nerves IX and X: Palate " elevates symmetrically.   Cranial nerve XI: Shoulder shrug and neck rotation strength are intact.   Cranial nerve XII: Tongue midline with normal strength.   Motor: Motor exam was normal. Muscle bulk was normal in both upper and lower extremities. Muscle tone was normal in both upper and lower extremities. Muscle strength was 5/5 throughout. no abnormal or adventitious movements were present.   Deep Tendon Reflexes: left biceps 2+ , right biceps 2+, left triceps 2+, right triceps 2+, left brachioradialis 2+, right brachioradialis 2+, left patella 2+, right patella 2+, left ankle jerk 2+, right ankle jerk 2+   Plantar Reflex: Toes downgoing to plantar stimulation on the left. Toes downgoing to plantar stimulation on the right.   Sensory Exam: Normal to light touch.   Coordination: There is no limb dystaxia and rapid alternating movements are intact.  Gait: Gait is normal without spasticity, ataxia or bradykinesia. Stance is stable with a negative Romberg.      ASSESSMENT/PLAN  Diagnoses and all orders for this visit:  Encephalopathy  Attention deficit hyperactivity disorder (ADHD), combined type  -     dexmethylphenidate XR (Focalin XR) 20 mg 24 hr capsule; Take 1 capsule (20 mg) by mouth once daily. Do not crush, chew, or split.  -     dexmethylphenidate XR (Focalin XR) 20 mg 24 hr capsule; Take 1 capsule (20 mg) by mouth once daily. Do not crush, chew, or split. Do not fill before June 5, 2024.  -     dexmethylphenidate (Focalin) 10 mg tablet; Take 1 tablet (10 mg) by mouth once daily.  -     dexmethylphenidate (Focalin) 10 mg tablet; Take 1 tablet (10 mg) by mouth once daily. Do not fill before June 5, 2024.  -     dexmethylphenidate (Focalin) 10 mg tablet; Take 1 tablet (10 mg) by mouth once daily. Do not fill before July 5, 2024.  -     dexmethylphenidate XR (Focalin XR) 20 mg 24 hr capsule; Take 1 capsule (20 mg) by mouth once daily. Do not crush, chew, or split. Do not fill before July 5, 2024.        Altaf MACKEY  MD Re  5/6/2024  3:32 PM

## 2024-05-07 RX ORDER — ROSUVASTATIN CALCIUM 5 MG/1
5 TABLET, COATED ORAL DAILY
Qty: 90 TABLET | Refills: 0 | Status: SHIPPED | OUTPATIENT
Start: 2024-05-07

## 2024-06-21 ENCOUNTER — APPOINTMENT (OUTPATIENT)
Dept: PRIMARY CARE | Facility: CLINIC | Age: 49
End: 2024-06-21
Payer: COMMERCIAL

## 2024-07-18 ENCOUNTER — APPOINTMENT (OUTPATIENT)
Dept: PRIMARY CARE | Facility: CLINIC | Age: 49
End: 2024-07-18
Payer: COMMERCIAL

## 2024-07-29 ENCOUNTER — APPOINTMENT (OUTPATIENT)
Dept: NEUROLOGY | Facility: CLINIC | Age: 49
End: 2024-07-29
Payer: COMMERCIAL

## 2024-08-15 ENCOUNTER — APPOINTMENT (OUTPATIENT)
Dept: PRIMARY CARE | Facility: CLINIC | Age: 49
End: 2024-08-15
Payer: COMMERCIAL

## 2024-09-19 ENCOUNTER — APPOINTMENT (OUTPATIENT)
Dept: PRIMARY CARE | Facility: CLINIC | Age: 49
End: 2024-09-19
Payer: COMMERCIAL

## 2024-09-19 VITALS
OXYGEN SATURATION: 97 % | HEART RATE: 63 BPM | DIASTOLIC BLOOD PRESSURE: 80 MMHG | HEIGHT: 63 IN | SYSTOLIC BLOOD PRESSURE: 120 MMHG | BODY MASS INDEX: 27.46 KG/M2 | WEIGHT: 155 LBS

## 2024-09-19 DIAGNOSIS — Z00.00 VISIT FOR PREVENTIVE HEALTH EXAMINATION: Primary | ICD-10-CM

## 2024-09-19 DIAGNOSIS — E66.3 OVERWEIGHT WITH BODY MASS INDEX (BMI) OF 27 TO 27.9 IN ADULT: ICD-10-CM

## 2024-09-19 DIAGNOSIS — E78.2 MIXED HYPERLIPIDEMIA: ICD-10-CM

## 2024-09-19 PROCEDURE — 99396 PREV VISIT EST AGE 40-64: CPT | Performed by: FAMILY MEDICINE

## 2024-09-19 PROCEDURE — 99213 OFFICE O/P EST LOW 20 MIN: CPT | Performed by: FAMILY MEDICINE

## 2024-09-19 PROCEDURE — 3008F BODY MASS INDEX DOCD: CPT | Performed by: FAMILY MEDICINE

## 2024-09-19 RX ORDER — PHENTERMINE HYDROCHLORIDE 37.5 MG/1
37.5 TABLET ORAL
Qty: 30 TABLET | Refills: 0 | Status: SHIPPED | OUTPATIENT
Start: 2024-09-19 | End: 2024-10-19

## 2024-09-19 ASSESSMENT — PROMIS GLOBAL HEALTH SCALE
RATE_GENERAL_HEALTH: GOOD
CARRYOUT_PHYSICAL_ACTIVITIES: COMPLETELY
RATE_AVERAGE_FATIGUE: MILD
RATE_QUALITY_OF_LIFE: VERY GOOD
RATE_MENTAL_HEALTH: GOOD
RATE_AVERAGE_PAIN: 4
RATE_PHYSICAL_HEALTH: GOOD
EMOTIONAL_PROBLEMS: RARELY
RATE_SOCIAL_SATISFACTION: VERY GOOD
CARRYOUT_SOCIAL_ACTIVITIES: VERY GOOD

## 2024-09-19 NOTE — PROGRESS NOTES
Subjective   Patient ID: Hannah Davidson is a 49 y.o. female who presents for Annual Exam (Declined flu shot ).    Assessment/Plan   Problem List Items Addressed This Visit       Overweight with body mass index (BMI) of 27 to 27.9 in adult    Visit for preventive health examination - Primary    Mixed hyperlipidemia   Colonoscopy in April 2024-> repeat in 3 years  Discussed about diet exercise  Discussed about age-related immunization  Discussed about fasting lab work  Follow-up every year for physical  Up-to-date with Pap smear  Come back early with any new signs and symptoms  Patient understood and agreed with plan.      HPI    49-year-old female here for physical      Started smoking again  No drug use  2-3 drinks of wine every day  Currently following with OB/GYN has a regular mammogram  BMI 27.9  Hyperlipidemia  On and off smoking  Vitamin D insufficiency  Family history of Hodgkin's lymphoma and mother  Discussed about cutting down on smoking    Family history is positive for colon cancer. Colon cancer screening at the age of 45  Patient will be advised to go for colonoscopy     Will like to restart Adipex  Previously lost weight successfully  Tolerated well  Risk-benefit discussed  Urine drug screen and controlled substance contract today  Follow-up in a month  Allergies   Allergen Reactions    Sulfa (Sulfonamide Antibiotics) Rash       Current Outpatient Medications   Medication Sig Dispense Refill    levonorgestrel (Mirena) 21 mcg/24 hours (8 yrs) 52 mg IUD 52 mg by intrauterine route.      rosuvastatin (Crestor) 5 mg tablet TAKE 1 TABLET BY MOUTH EVERY DAY 90 tablet 0    valACYclovir (Valtrex) 1 gram tablet TAKE 1 TABLET BY MOUTH TWICE DAILY. AS NEEDED FOR OUTBREAK      valACYclovir (Valtrex) 500 mg tablet Take 1 tablet (500 mg) by mouth once daily.      valACYclovir (Valtrex) 1 gram tablet Take 1 tablet (1,000 mg) by mouth twice a day.       No current facility-administered medications for this  "visit.       Objective   Visit Vitals  /80 (BP Location: Left arm, Patient Position: Sitting)   Pulse 63   Ht 1.588 m (5' 2.5\")   Wt 70.3 kg (155 lb)   SpO2 97%   BMI 27.90 kg/m²   Smoking Status Some Days   BSA 1.76 m²     Physical Exam  Constitutional   General appearance: Alert and in no acute distress.   Head and Face   Head and face: Normal.     Palpation of the face and sinuses: Normal.    Eyes   Inspection of eyes: Sclera and conjunctiva were normal.    Pupil exam: Pupils were equal in size. Extraocular movements were intact.   Ears, Nose, Mouth, and Throat   Ears: Auricles: Normal.    Otoscopic examination: Tympanic membranes: Normal with no congestion and no discharge. Otic Canals: Normal without tenderness, congestion or discharge.    Hearing: Normal.     Nasal mucosa, septum, and turbinates: Normal without edema or erythema.    Lips, teeth, and gums: Normal.    Oropharynx: Normal with moist mucus membranes, no congestion. Tonsils: Normal no follicles.   Neck   Neck Exam: Appearance of the neck was normal. No neck masses observed.    Thyroid exam: Not enlarged and no palpable thyroid nodules.   Pulmonary   Respiratory assessment: No respiratory distress, normal respiratory rhythm and effort.    Auscultation of Lungs: Clear bilateral breath sounds.   Cardiovascular   Auscultation of heart: Apical pulse normal, heart rate and rhythm normal, normal S1 and S2, no murmurs and no pericardial rub.    Carotid arteries: Pulses normal with no bruits.    Exam for edema: No peripheral edema.   Chest   Chest: Normal A_P diameter, no pulsation, no intercostal withdrawing. Trachea central.   Abdomen   Abdominal Exam: No bruits, normal bowel sounds, soft, non-tender, no abdominal mass palpated.    Liver and Spleen exam: No hepato-splenomegaly.    Examination for hernias: Normal.      Lymphatic   Palpation of lymph nodes in neck: No cervical lymphadenopathy.   Musculoskeletal   Examination of gait: Normal.  "   Inspection of digits and nails: No clubbing or cyanosis of the fingernails.    Inspection/palpation of joints, bones and muscles: No joint swelling. Normal movement of all extremities.    Range of Motion: Normal movement of all extremities.   Skin   Skin inspection: Normal skin color and pigmentation, normal skin turgor and no visible rash.   Neurologic   Cranial nerves: Nerves 2-12 were intact, no focal neuro defects.    Reflexes: Normal.     Sensation: Normal.     Coordination: Normal.    Psychiatric   Judgment and insight: Intact.    Orientation: Oriented to person, place, and time.    Recent and remote memory: Normal.     Mood and affect: Normal.     Genitourinary -follow-up with OB/GYN     Immunization History   Administered Date(s) Administered    Flu vaccine, quadrivalent, recombinant, preservative free, adult (FLUBLOK) 01/19/2023    Influenza, injectable, quadrivalent 11/13/2020, 11/02/2021    Influenza, seasonal, injectable 01/19/2023    MMR vaccine, subcutaneous (MMR II) 08/28/2013    Moderna SARS-CoV-2 Vaccination 02/10/2021, 03/10/2021, 06/01/2022    Tdap vaccine, age 7 year and older (BOOSTRIX, ADACEL) 08/28/2013, 09/06/2023       Review of Systems    No visits with results within 4 Month(s) from this visit.   Latest known visit with results is:   Office Visit on 02/22/2024   Component Date Value Ref Range Status    Amphetamine Screen, Urine 02/22/2024 Presumptive Negative  Presumptive Negative Final    Barbiturate Screen, Urine 02/22/2024 Presumptive Negative  Presumptive Negative Final    Benzodiazepines Screen, Urine 02/22/2024 Presumptive Negative  Presumptive Negative Final    Cannabinoid Screen, Urine 02/22/2024 Presumptive Negative  Presumptive Negative Final    Cocaine Metabolite Screen, Urine 02/22/2024 Presumptive Negative  Presumptive Negative Final    Fentanyl Screen, Urine 02/22/2024 Presumptive Negative  Presumptive Negative Final    Opiate Screen, Urine 02/22/2024 Presumptive Negative   Presumptive Negative Final    Oxycodone Screen, Urine 02/22/2024 Presumptive Negative  Presumptive Negative Final    PCP Screen, Urine 02/22/2024 Presumptive Negative  Presumptive Negative Final       Radiology: Reviewed imaging in powerchart.  No results found.    Family History   Problem Relation Name Age of Onset    Lymphoma Mother      Other (CVA) Maternal Grandmother      Diabetes Paternal Grandmother      Colon cancer Other       Social History     Socioeconomic History    Marital status:    Tobacco Use    Smoking status: Some Days     Current packs/day: 0.25     Average packs/day: 0.3 packs/day for 2.1 years (0.5 ttl pk-yrs)     Types: Cigarettes     Start date: 8/1/2022    Smokeless tobacco: Never   Vaping Use    Vaping status: Never Used   Substance and Sexual Activity    Alcohol use: Yes     Alcohol/week: 9.0 standard drinks of alcohol     Types: 6 Glasses of wine, 2 Cans of beer, 1 Standard drinks or equivalent per week    Drug use: Never    Sexual activity: Yes     Partners: Male     Birth control/protection: I.U.D.     Social Determinants of Health     Financial Resource Strain: Low Risk  (11/10/2020)    Received from [facility]    Overall Financial Resource Strain (CARDIA)     Difficulty of Paying Living Expenses: Not hard at all   Food Insecurity: No Food Insecurity (11/10/2020)    Received from [facility]    Hunger Vital Sign     Worried About Running Out of Food in the Last Year: Never true     Ran Out of Food in the Last Year: Never true   Transportation Needs: No Transportation Needs (11/10/2020)    Received from [facility]    PRAPARE - Transportation     Lack of Transportation (Medical): No     Lack of Transportation (Non-Medical): No   Physical Activity: Insufficiently Active (11/10/2020)    Received from [facility]    Exercise Vital Sign     Days of Exercise per Week: 3 days     Minutes of  Exercise per Session: 30 min   Stress: Stress Concern Present (11/10/2020)    Received from [facility] Occup[facility] - Occupational Stress Questionnaire     Feeling of Stress : To some extent   Social Connections: Socially Integrated (11/10/2020)    Received from [facility]    Social Connection and Isolation Panel [NHANES]     Frequency of Communication with Friends and Family: Three times a week     Frequency of Social Gatherings with Friends and Family: Once a week     Attends [employer] Services: 1 to 4 times per year     Active Member of Clubs or Organizations: Yes     Attends Club or Organization Meetings: 1 to 4 times per year     Marital Status:    Housing Stability: Unknown (11/10/2020)    Received from [facility]    Housing Stability Vital Sign     Unable to Pay for Housing in the Last Year: No     Unstable Housing in the Last Year: No     Past Medical History:   Diagnosis Date    Personal history of other complications of pregnancy, childbirth and the puerperium     History of spontaneous     Personal history of other infectious and parasitic diseases     History of herpes simplex infection    Personal history of other mental and behavioral disorders     History of anxiety    Personal history of other mental and behavioral disorders     History of obsessive compulsive disorder     Past Surgical History:   Procedure Laterality Date    DILATION AND CURETTAGE OF UTERUS  2018    Dilation And Curettage       Charting was completed using voice recognition technology and may include unintended errors.
